# Patient Record
Sex: FEMALE | Race: WHITE | HISPANIC OR LATINO | Employment: UNEMPLOYED | URBAN - METROPOLITAN AREA
[De-identification: names, ages, dates, MRNs, and addresses within clinical notes are randomized per-mention and may not be internally consistent; named-entity substitution may affect disease eponyms.]

---

## 2017-01-08 ENCOUNTER — HOSPITAL ENCOUNTER (EMERGENCY)
Facility: HOSPITAL | Age: 37
Discharge: HOME | End: 2017-01-09
Attending: EMERGENCY MEDICINE | Admitting: EMERGENCY MEDICINE
Payer: COMMERCIAL

## 2017-01-08 DIAGNOSIS — R55 SYNCOPE: ICD-10-CM

## 2017-01-08 DIAGNOSIS — G62.9 PERIPHERAL NEUROPATHY: Primary | ICD-10-CM

## 2017-01-08 LAB
ANION GAP SERPL CALCULATED.3IONS-SCNC: 7 MMOL/L (ref 4–13)
BASOPHILS # BLD AUTO: 0.03 THOUSANDS/ΜL (ref 0–0.1)
BASOPHILS NFR BLD AUTO: 0 % (ref 0–1)
BUN SERPL-MCNC: 16 MG/DL (ref 5–25)
CALCIUM SERPL-MCNC: 8.1 MG/DL (ref 8.3–10.1)
CHLORIDE SERPL-SCNC: 102 MMOL/L (ref 100–108)
CO2 SERPL-SCNC: 27 MMOL/L (ref 21–32)
CREAT SERPL-MCNC: 1.25 MG/DL (ref 0.6–1.3)
EOSINOPHIL # BLD AUTO: 0.15 THOUSAND/ΜL (ref 0–0.61)
EOSINOPHIL NFR BLD AUTO: 2 % (ref 0–6)
ERYTHROCYTE [DISTWIDTH] IN BLOOD BY AUTOMATED COUNT: 15.1 % (ref 11.6–15.1)
GFR SERPL CREATININE-BSD FRML MDRD: 48 ML/MIN/1.73SQ M
GLUCOSE SERPL-MCNC: 360 MG/DL (ref 65–140)
HCT VFR BLD AUTO: 39.4 % (ref 34.8–46.1)
HGB BLD-MCNC: 12.9 G/DL (ref 11.5–15.4)
LYMPHOCYTES # BLD AUTO: 1.39 THOUSANDS/ΜL (ref 0.6–4.47)
LYMPHOCYTES NFR BLD AUTO: 17 % (ref 14–44)
MCH RBC QN AUTO: 29.3 PG (ref 26.8–34.3)
MCHC RBC AUTO-ENTMCNC: 32.7 G/DL (ref 31.4–37.4)
MCV RBC AUTO: 90 FL (ref 82–98)
MONOCYTES # BLD AUTO: 0.74 THOUSAND/ΜL (ref 0.17–1.22)
MONOCYTES NFR BLD AUTO: 9 % (ref 4–12)
NEUTROPHILS # BLD AUTO: 5.82 THOUSANDS/ΜL (ref 1.85–7.62)
NEUTS SEG NFR BLD AUTO: 72 % (ref 43–75)
NRBC BLD AUTO-RTO: 0 /100 WBCS
PLATELET # BLD AUTO: 195 THOUSANDS/UL (ref 149–390)
PMV BLD AUTO: 12.6 FL (ref 8.9–12.7)
POTASSIUM SERPL-SCNC: 5.8 MMOL/L (ref 3.5–5.3)
RBC # BLD AUTO: 4.4 MILLION/UL (ref 3.81–5.12)
SODIUM SERPL-SCNC: 136 MMOL/L (ref 136–145)
WBC # BLD AUTO: 8.17 THOUSAND/UL (ref 4.31–10.16)

## 2017-01-08 PROCEDURE — 81025 URINE PREGNANCY TEST: CPT | Performed by: EMERGENCY MEDICINE

## 2017-01-08 PROCEDURE — 80048 BASIC METABOLIC PNL TOTAL CA: CPT | Performed by: EMERGENCY MEDICINE

## 2017-01-08 PROCEDURE — 36415 COLL VENOUS BLD VENIPUNCTURE: CPT | Performed by: EMERGENCY MEDICINE

## 2017-01-08 PROCEDURE — 81002 URINALYSIS NONAUTO W/O SCOPE: CPT | Performed by: EMERGENCY MEDICINE

## 2017-01-08 PROCEDURE — 85025 COMPLETE CBC W/AUTO DIFF WBC: CPT | Performed by: EMERGENCY MEDICINE

## 2017-01-08 PROCEDURE — 93005 ELECTROCARDIOGRAM TRACING: CPT | Performed by: EMERGENCY MEDICINE

## 2017-01-08 PROCEDURE — 96360 HYDRATION IV INFUSION INIT: CPT

## 2017-01-08 RX ORDER — ACETAMINOPHEN 325 MG/1
650 TABLET ORAL ONCE
Status: COMPLETED | OUTPATIENT
Start: 2017-01-08 | End: 2017-01-08

## 2017-01-08 RX ADMIN — SODIUM CHLORIDE 1000 ML: 0.9 INJECTION, SOLUTION INTRAVENOUS at 22:43

## 2017-01-08 RX ADMIN — ACETAMINOPHEN 650 MG: 325 TABLET, FILM COATED ORAL at 22:43

## 2017-01-09 VITALS
RESPIRATION RATE: 18 BRPM | HEART RATE: 88 BPM | WEIGHT: 293 LBS | HEIGHT: 65 IN | TEMPERATURE: 98 F | OXYGEN SATURATION: 99 % | SYSTOLIC BLOOD PRESSURE: 141 MMHG | BODY MASS INDEX: 48.82 KG/M2 | DIASTOLIC BLOOD PRESSURE: 67 MMHG

## 2017-01-09 LAB
ATRIAL RATE: 95 BPM
BILIRUB UR QL STRIP: NEGATIVE
CLARITY UR: CLEAR
COLOR UR: YELLOW
COLOR, POC: NORMAL
GLUCOSE UR STRIP-MCNC: ABNORMAL MG/DL
HGB UR QL STRIP.AUTO: NEGATIVE
KETONES UR STRIP-MCNC: NEGATIVE MG/DL
LEUKOCYTE ESTERASE UR QL STRIP: NEGATIVE
NITRITE UR QL STRIP: NEGATIVE
P AXIS: 59 DEGREES
PH UR STRIP.AUTO: 5.5 [PH] (ref 4.5–8)
POTASSIUM SERPL-SCNC: 4.5 MMOL/L (ref 3.5–5.3)
PR INTERVAL: 130 MS
PROT UR STRIP-MCNC: NEGATIVE MG/DL
QRS AXIS: 49 DEGREES
QRSD INTERVAL: 90 MS
QT INTERVAL: 346 MS
QTC INTERVAL: 434 MS
SP GR UR STRIP.AUTO: 1.02 (ref 1–1.03)
T WAVE AXIS: 45 DEGREES
UROBILINOGEN UR QL STRIP.AUTO: 0.2 E.U./DL
VENTRICULAR RATE: 95 BPM

## 2017-01-09 PROCEDURE — 36415 COLL VENOUS BLD VENIPUNCTURE: CPT | Performed by: EMERGENCY MEDICINE

## 2017-01-09 PROCEDURE — 99283 EMERGENCY DEPT VISIT LOW MDM: CPT

## 2017-01-09 PROCEDURE — 84132 ASSAY OF SERUM POTASSIUM: CPT | Performed by: EMERGENCY MEDICINE

## 2017-01-09 PROCEDURE — 81003 URINALYSIS AUTO W/O SCOPE: CPT

## 2017-02-03 ENCOUNTER — RX ONLY (RX ONLY)
Age: 37
End: 2017-02-03

## 2017-02-03 ENCOUNTER — DOCTOR'S OFFICE (OUTPATIENT)
Dept: URBAN - METROPOLITAN AREA CLINIC 136 | Facility: CLINIC | Age: 37
Setting detail: OPHTHALMOLOGY
End: 2017-02-03
Payer: COMMERCIAL

## 2017-02-03 DIAGNOSIS — E11.3212: ICD-10-CM

## 2017-02-03 DIAGNOSIS — F17.200: ICD-10-CM

## 2017-02-03 DIAGNOSIS — E11.3291: ICD-10-CM

## 2017-02-03 DIAGNOSIS — H40.003: ICD-10-CM

## 2017-02-03 DIAGNOSIS — H25.13: ICD-10-CM

## 2017-02-03 PROCEDURE — 92134 CPTRZ OPH DX IMG PST SGM RTA: CPT | Performed by: OPHTHALMOLOGY

## 2017-02-03 PROCEDURE — 92014 COMPRE OPH EXAM EST PT 1/>: CPT | Performed by: OPHTHALMOLOGY

## 2017-02-03 ASSESSMENT — REFRACTION_CURRENTRX
OS_OVR_VA: 20/
OD_OVR_VA: 20/
OD_OVR_VA: 20/
OS_OVR_VA: 20/
OS_OVR_VA: 20/
OD_OVR_VA: 20/

## 2017-02-03 ASSESSMENT — CONFRONTATIONAL VISUAL FIELD TEST (CVF)
OD_FINDINGS: FULL
OS_FINDINGS: FULL

## 2017-02-03 ASSESSMENT — REFRACTION_MANIFEST
OS_VA2: 20/
OD_VA1: 20/
OS_VA1: 20/
OD_VA2: 20/
OU_VA: 20/
OD_VA3: 20/
OU_VA: 20/
OS_VA3: 20/
OD_VA1: 20/
OS_VA3: 20/
OD_VA3: 20/
OD_VA3: 20/
OU_VA: 20/
OS_VA1: 20/
OD_VA2: 20/
OS_VA3: 20/
OS_VA2: 20/
OD_VA2: 20/
OS_VA2: 20/
OS_VA1: 20/
OD_VA1: 20/

## 2017-02-03 ASSESSMENT — VISUAL ACUITY
OS_BCVA: 20/70
OD_BCVA: 20/60

## 2017-02-14 ENCOUNTER — ALLSCRIPTS OFFICE VISIT (OUTPATIENT)
Dept: OTHER | Facility: OTHER | Age: 37
End: 2017-02-14

## 2017-02-14 DIAGNOSIS — E11.40 TYPE 2 DIABETES MELLITUS WITH DIABETIC NEUROPATHY (HCC): ICD-10-CM

## 2017-04-02 ENCOUNTER — HOSPITAL ENCOUNTER (EMERGENCY)
Facility: HOSPITAL | Age: 37
Discharge: HOME | End: 2017-04-02
Attending: EMERGENCY MEDICINE
Payer: COMMERCIAL

## 2017-04-02 ENCOUNTER — APPOINTMENT (EMERGENCY)
Dept: RADIOLOGY | Facility: HOSPITAL | Age: 37
End: 2017-04-02
Payer: COMMERCIAL

## 2017-04-02 VITALS
TEMPERATURE: 97.5 F | SYSTOLIC BLOOD PRESSURE: 167 MMHG | HEART RATE: 80 BPM | OXYGEN SATURATION: 97 % | DIASTOLIC BLOOD PRESSURE: 99 MMHG | RESPIRATION RATE: 18 BRPM | BODY MASS INDEX: 49.92 KG/M2 | WEIGHT: 293 LBS

## 2017-04-02 DIAGNOSIS — L03.211 FACIAL CELLULITIS: ICD-10-CM

## 2017-04-02 DIAGNOSIS — R22.0 FACIAL SWELLING: Primary | ICD-10-CM

## 2017-04-02 LAB
ANION GAP SERPL CALCULATED.3IONS-SCNC: 5 MMOL/L (ref 4–13)
BASOPHILS # BLD AUTO: 0.01 THOUSANDS/ΜL (ref 0–0.1)
BASOPHILS NFR BLD AUTO: 0 % (ref 0–1)
BUN SERPL-MCNC: 12 MG/DL (ref 5–25)
CALCIUM SERPL-MCNC: 7.8 MG/DL (ref 8.3–10.1)
CHLORIDE SERPL-SCNC: 103 MMOL/L (ref 100–108)
CO2 SERPL-SCNC: 31 MMOL/L (ref 21–32)
CREAT SERPL-MCNC: 1.21 MG/DL (ref 0.6–1.3)
EOSINOPHIL # BLD AUTO: 0.11 THOUSAND/ΜL (ref 0–0.61)
EOSINOPHIL NFR BLD AUTO: 2 % (ref 0–6)
ERYTHROCYTE [DISTWIDTH] IN BLOOD BY AUTOMATED COUNT: 15.3 % (ref 11.6–15.1)
GFR SERPL CREATININE-BSD FRML MDRD: 49.5 ML/MIN/1.73SQ M
GLUCOSE SERPL-MCNC: 362 MG/DL (ref 65–140)
HCT VFR BLD AUTO: 38.4 % (ref 34.8–46.1)
HGB BLD-MCNC: 12.6 G/DL (ref 11.5–15.4)
LYMPHOCYTES # BLD AUTO: 1.48 THOUSANDS/ΜL (ref 0.6–4.47)
LYMPHOCYTES NFR BLD AUTO: 21 % (ref 14–44)
MCH RBC QN AUTO: 29.6 PG (ref 26.8–34.3)
MCHC RBC AUTO-ENTMCNC: 32.8 G/DL (ref 31.4–37.4)
MCV RBC AUTO: 90 FL (ref 82–98)
MONOCYTES # BLD AUTO: 0.47 THOUSAND/ΜL (ref 0.17–1.22)
MONOCYTES NFR BLD AUTO: 7 % (ref 4–12)
NEUTROPHILS # BLD AUTO: 4.9 THOUSANDS/ΜL (ref 1.85–7.62)
NEUTS SEG NFR BLD AUTO: 70 % (ref 43–75)
NRBC BLD AUTO-RTO: 0 /100 WBCS
PLATELET # BLD AUTO: 159 THOUSANDS/UL (ref 149–390)
PMV BLD AUTO: 12.2 FL (ref 8.9–12.7)
POTASSIUM SERPL-SCNC: 3.9 MMOL/L (ref 3.5–5.3)
RBC # BLD AUTO: 4.25 MILLION/UL (ref 3.81–5.12)
SODIUM SERPL-SCNC: 139 MMOL/L (ref 136–145)
WBC # BLD AUTO: 7.01 THOUSAND/UL (ref 4.31–10.16)

## 2017-04-02 PROCEDURE — 99284 EMERGENCY DEPT VISIT MOD MDM: CPT

## 2017-04-02 PROCEDURE — 36415 COLL VENOUS BLD VENIPUNCTURE: CPT | Performed by: EMERGENCY MEDICINE

## 2017-04-02 PROCEDURE — 96361 HYDRATE IV INFUSION ADD-ON: CPT

## 2017-04-02 PROCEDURE — 70487 CT MAXILLOFACIAL W/DYE: CPT

## 2017-04-02 PROCEDURE — 80048 BASIC METABOLIC PNL TOTAL CA: CPT | Performed by: EMERGENCY MEDICINE

## 2017-04-02 PROCEDURE — 85025 COMPLETE CBC W/AUTO DIFF WBC: CPT | Performed by: EMERGENCY MEDICINE

## 2017-04-02 PROCEDURE — 96374 THER/PROPH/DIAG INJ IV PUSH: CPT

## 2017-04-02 PROCEDURE — 96372 THER/PROPH/DIAG INJ SC/IM: CPT

## 2017-04-02 RX ORDER — CITALOPRAM 40 MG/1
TABLET ORAL
COMMUNITY

## 2017-04-02 RX ORDER — CLINDAMYCIN HYDROCHLORIDE 150 MG/1
450 CAPSULE ORAL ONCE
Status: COMPLETED | OUTPATIENT
Start: 2017-04-02 | End: 2017-04-02

## 2017-04-02 RX ORDER — INSULIN GLARGINE 100 [IU]/ML
5 INJECTION, SOLUTION SUBCUTANEOUS ONCE
Status: DISCONTINUED | OUTPATIENT
Start: 2017-04-02 | End: 2017-04-02

## 2017-04-02 RX ORDER — GABAPENTIN 400 MG/1
CAPSULE ORAL
COMMUNITY
Start: 2015-09-14

## 2017-04-02 RX ORDER — CLINDAMYCIN HYDROCHLORIDE 150 MG/1
450 CAPSULE ORAL 3 TIMES DAILY
Qty: 90 CAPSULE | Refills: 0 | Status: SHIPPED | OUTPATIENT
Start: 2017-04-02 | End: 2017-04-12

## 2017-04-02 RX ORDER — KETOROLAC TROMETHAMINE 30 MG/ML
15 INJECTION, SOLUTION INTRAMUSCULAR; INTRAVENOUS ONCE
Status: COMPLETED | OUTPATIENT
Start: 2017-04-02 | End: 2017-04-02

## 2017-04-02 RX ORDER — LISINOPRIL 20 MG/1
TABLET ORAL
COMMUNITY
Start: 2013-01-10

## 2017-04-02 RX ORDER — INSULIN GLARGINE 100 [IU]/ML
INJECTION, SOLUTION SUBCUTANEOUS
COMMUNITY
Start: 2013-01-10

## 2017-04-02 RX ADMIN — INSULIN HUMAN 5 UNITS: 100 INJECTION, SOLUTION PARENTERAL at 15:34

## 2017-04-02 RX ADMIN — KETOROLAC TROMETHAMINE 15 MG: 30 INJECTION, SOLUTION INTRAMUSCULAR at 14:38

## 2017-04-02 RX ADMIN — IOHEXOL 85 ML: 350 INJECTION, SOLUTION INTRAVENOUS at 15:17

## 2017-04-02 RX ADMIN — CLINDAMYCIN HYDROCHLORIDE 450 MG: 150 CAPSULE ORAL at 17:17

## 2017-04-02 RX ADMIN — SODIUM CHLORIDE 1000 ML: 0.9 INJECTION, SOLUTION INTRAVENOUS at 15:30

## 2017-05-08 ENCOUNTER — ALLSCRIPTS OFFICE VISIT (OUTPATIENT)
Dept: OTHER | Facility: OTHER | Age: 37
End: 2017-05-08

## 2017-05-16 ENCOUNTER — ALLSCRIPTS OFFICE VISIT (OUTPATIENT)
Dept: OTHER | Facility: OTHER | Age: 37
End: 2017-05-16

## 2017-06-26 ENCOUNTER — APPOINTMENT (OUTPATIENT)
Dept: LAB | Facility: HOSPITAL | Age: 37
End: 2017-06-26
Payer: COMMERCIAL

## 2017-06-26 ENCOUNTER — TRANSCRIBE ORDERS (OUTPATIENT)
Dept: LAB | Facility: HOSPITAL | Age: 37
End: 2017-06-26

## 2017-06-26 DIAGNOSIS — Z13.9 SCREENING FOR CONDITION: ICD-10-CM

## 2017-06-26 DIAGNOSIS — E11.69 TYPE 2 DIABETES MELLITUS WITH OTHER SPECIFIED COMPLICATION (HCC): ICD-10-CM

## 2017-06-26 DIAGNOSIS — Z79.899 HIGH RISK MEDICATION USE: Primary | ICD-10-CM

## 2017-06-26 DIAGNOSIS — Z79.899 HIGH RISK MEDICATION USE: ICD-10-CM

## 2017-06-26 LAB
25(OH)D3 SERPL-MCNC: 23 NG/ML (ref 30–100)
ALBUMIN SERPL BCP-MCNC: 2.8 G/DL (ref 3.5–5)
ALP SERPL-CCNC: 112 U/L (ref 46–116)
ALT SERPL W P-5'-P-CCNC: 31 U/L (ref 12–78)
ANION GAP SERPL CALCULATED.3IONS-SCNC: 5 MMOL/L (ref 4–13)
AST SERPL W P-5'-P-CCNC: 29 U/L (ref 5–45)
BILIRUB SERPL-MCNC: 0.29 MG/DL (ref 0.2–1)
BUN SERPL-MCNC: 13 MG/DL (ref 5–25)
CALCIUM SERPL-MCNC: 8.4 MG/DL (ref 8.3–10.1)
CHLORIDE SERPL-SCNC: 102 MMOL/L (ref 100–108)
CHOLEST SERPL-MCNC: 140 MG/DL (ref 50–200)
CO2 SERPL-SCNC: 28 MMOL/L (ref 21–32)
CREAT SERPL-MCNC: 1.02 MG/DL (ref 0.6–1.3)
ERYTHROCYTE [DISTWIDTH] IN BLOOD BY AUTOMATED COUNT: 14.3 % (ref 11.6–15.1)
EST. AVERAGE GLUCOSE BLD GHB EST-MCNC: 266 MG/DL
FERRITIN SERPL-MCNC: 43 NG/ML (ref 8–388)
FOLATE SERPL-MCNC: 14.2 NG/ML (ref 3.1–17.5)
GFR SERPL CREATININE-BSD FRML MDRD: >60 ML/MIN/1.73SQ M
GLUCOSE P FAST SERPL-MCNC: 308 MG/DL (ref 65–99)
HBA1C MFR BLD: 10.9 % (ref 4.2–6.3)
HCT VFR BLD AUTO: 40.5 % (ref 34.8–46.1)
HCYS SERPL-SCNC: 8.9 UMOL/L (ref 3.7–11.2)
HDLC SERPL-MCNC: 25 MG/DL (ref 40–60)
HGB BLD-MCNC: 13.5 G/DL (ref 11.5–15.4)
LDLC SERPL CALC-MCNC: 77 MG/DL (ref 0–100)
MCH RBC QN AUTO: 30.1 PG (ref 26.8–34.3)
MCHC RBC AUTO-ENTMCNC: 33.3 G/DL (ref 31.4–37.4)
MCV RBC AUTO: 90 FL (ref 82–98)
PLATELET # BLD AUTO: 201 THOUSANDS/UL (ref 149–390)
PMV BLD AUTO: 12.4 FL (ref 8.9–12.7)
POTASSIUM SERPL-SCNC: 4 MMOL/L (ref 3.5–5.3)
PROT SERPL-MCNC: 6.7 G/DL (ref 6.4–8.2)
RBC # BLD AUTO: 4.49 MILLION/UL (ref 3.81–5.12)
SODIUM SERPL-SCNC: 135 MMOL/L (ref 136–145)
TRIGL SERPL-MCNC: 188 MG/DL
TSH SERPL DL<=0.05 MIU/L-ACNC: 1.63 UIU/ML (ref 0.36–3.74)
VIT B12 SERPL-MCNC: 577 PG/ML (ref 100–900)
WBC # BLD AUTO: 5.93 THOUSAND/UL (ref 4.31–10.16)

## 2017-06-26 PROCEDURE — 82306 VITAMIN D 25 HYDROXY: CPT

## 2017-06-26 PROCEDURE — 84443 ASSAY THYROID STIM HORMONE: CPT

## 2017-06-26 PROCEDURE — 85027 COMPLETE CBC AUTOMATED: CPT

## 2017-06-26 PROCEDURE — 82746 ASSAY OF FOLIC ACID SERUM: CPT

## 2017-06-26 PROCEDURE — 36415 COLL VENOUS BLD VENIPUNCTURE: CPT

## 2017-06-26 PROCEDURE — 83036 HEMOGLOBIN GLYCOSYLATED A1C: CPT

## 2017-06-26 PROCEDURE — 80061 LIPID PANEL: CPT

## 2017-06-26 PROCEDURE — 82607 VITAMIN B-12: CPT

## 2017-06-26 PROCEDURE — 82728 ASSAY OF FERRITIN: CPT

## 2017-06-26 PROCEDURE — 80053 COMPREHEN METABOLIC PANEL: CPT

## 2017-06-26 PROCEDURE — 83090 ASSAY OF HOMOCYSTEINE: CPT

## 2017-07-28 ENCOUNTER — ALLSCRIPTS OFFICE VISIT (OUTPATIENT)
Dept: OTHER | Facility: OTHER | Age: 37
End: 2017-07-28

## 2017-08-10 ENCOUNTER — ALLSCRIPTS OFFICE VISIT (OUTPATIENT)
Dept: OTHER | Facility: OTHER | Age: 37
End: 2017-08-10

## 2018-01-09 NOTE — MISCELLANEOUS
Provider Comments  Provider Comments:   PT WAS A NO SHOW TODAY      Signatures   Electronically signed by : Mardy Najjar, ; Jul 28 2017  2:00PM EST                       (Author)

## 2018-01-11 NOTE — RESULT NOTES
Verified Results  (1) CBC/PLT/DIFF 27Dec2016 12:04PM Mariela Oden Order Number: [id number]     Test Name Result Flag Reference   WBC COUNT 6 64 Thousand/uL  4 31-10 16   RBC COUNT 4 64 Million/uL  3 81-5 12   HEMOGLOBIN 13 5 g/dL  11 5-15 4   HEMATOCRIT 41 2 %  34 8-46  1   MCV 89 fL  82-98   MCH 29 1 pg  26 8-34 3   MCHC 32 8 g/dL  31 4-37 4   RDW 15 4 % H 11 6-15 1   MPV 12 6 fL  8 9-12 7   PLATELET COUNT 859 Thousands/uL  149-390   nRBC AUTOMATED 0 /100 WBCs     NEUTROPHILS RELATIVE PERCENT 63 %  43-75   LYMPHOCYTES RELATIVE PERCENT 26 %  14-44   MONOCYTES RELATIVE PERCENT 9 %  4-12   EOSINOPHILS RELATIVE PERCENT 2 %  0-6   BASOPHILS RELATIVE PERCENT 0 %  0-1   NEUTROPHILS ABSOLUTE COUNT 4 08 Thousands/?L  1 85-7 62   LYMPHOCYTES ABSOLUTE COUNT 1 75 Thousands/?L  0 60-4 47   MONOCYTES ABSOLUTE COUNT 0 62 Thousand/?L  0 17-1 22   EOSINOPHILS ABSOLUTE COUNT 0 14 Thousand/?L  0 00-0 61   BASOPHILS ABSOLUTE COUNT 0 02 Thousands/?L  0 00-0 10   - Patient Instructions: This bloodwork is non-fasting  Please drink two glasses of water morning of bloodwork  - Patient Instructions: This bloodwork is non-fasting  Please drink two glasses of water morning of bloodwork  (1) LIPID PANEL, FASTING 27Dec2016 12:04PM Mariela Oden Order Number: [id number]     Test Name Result Flag Reference   CHOLESTEROL 188 mg/dL     HDL,DIRECT 40 mg/dL  40-60   Specimen collection should occur prior to Metamizole administration due to the potential for falsely depressed results  LDL CHOLESTEROL CALCULATED 115 mg/dL H 0-100   - Patient Instructions: This is a fasting blood test  Water,black tea or black  coffee only after 9:00pm the night before test   Drink 2 glasses of water the morning of test     - Patient Instructions:  This is a fasting blood test  Water,black tea or black  coffee only after 9:00pm the night before test Drink 2 glasses of water the morning of test   Triglyceride: Normal              <150 mg/dl       Borderline High    150-199 mg/dl       High               200-499 mg/dl       Very High          >499 mg/dl  Cholesterol:         Desirable        <200 mg/dl      Borderline High  200-239 mg/dl      High             >239 mg/dl  HDL Cholesterol:        High    >59 mg/dL      Low     <41 mg/dL  LDL CALCULATED:    This screening LDL is a calculated result  It does not have the accuracy of the Direct Measured LDL in the monitoring of patients with hyperlipidemia and/or statin therapy  Direct Measure LDL (ADV620) must be ordered separately in these patients  TRIGLYCERIDES 167 mg/dL H <=150   Specimen collection should occur prior to N-Acetylcysteine or Metamizole administration due to the potential for falsely depressed results  (1) HEMOGLOBIN A1C 27Mdo3837 12:04PM [facility]  Order Number: [id number]     Test Name Result Flag Reference   HEMOGLOBIN A1C 11 6 % H 4 2-6 3   EST  AVG  GLUCOSE 286 mg/dl       (1) MICROALBUMIN CREATININE RATIO, RANDOM URINE 15Mxo1478 12:04PM [facility]  Order Number: [id number]     Test Name Result Flag Reference   MICROALBUMIN/ CREAT R 13 mg/g creatinine  0-30   MICROALBUMIN,URINE 19 2 mg/L  0 0-20 0   CREATININE URINE 147 0 mg/dL       (1) COMPREHENSIVE METABOLIC PANEL 86OZN8627 62:28FG [facility]  Order Number: [id number]     Test Name Result Flag Reference   GLUCOSE,RANDM 268 mg/dL H    If the patient is fasting, the ADA then defines impaired fasting glucose as > 100 mg/dL and diabetes as > or equal to 123 mg/dL     SODIUM 138 mmol/L  136-145   POTASSIUM 4 1 mmol/L  3 5-5 3   CHLORIDE 101 mmol/L  100-108   CARBON DIOXIDE 28 mmol/L  21-32   ANION GAP (CALC) 9 mmol/L  4-13   BLOOD UREA NITROGEN 12 mg/dL  5-25   CREATININE 0 87 mg/dL  0 60-1 30   Standardized to IDMS reference method   CALCIUM 8 6 mg/dL  8 3-10 1   BILI, TOTAL 0 25 mg/dL  0 20-1 00   ALK PHOSPHATAS 112 U/L     ALT (SGPT) 25 U/L  12-78   AST(SGOT) 13 U/L  5-45   ALBUMIN 3 3 g/dL L 3 5-5 0   TOTAL PROTEIN 6 6 g/dL  6 4-8 2   eGFR Non-African American      >60 0 ml/min/1 73sq m   - Patient Instructions: This is a fasting blood test  Water,black tea or black  coffee only after 9:00pm the night before test Drink 2 glasses of water the morning of test   National Kidney Disease Education Program recommendations are as follows:  GFR calculation is accurate only with a steady state creatinine  Chronic Kidney disease less than 60 ml/min/1 73 sq  meters  Kidney failure less than 15 ml/min/1 73 sq  meters  (1) VITAMIN B12 01Zgd2794 12:04PM [employer] [facility] Order Number: [id number]     Test Name Result Flag Reference   VITAMIN B12 545 pg/mL  100-900   - Patient Instructions: This is a fasting blood test  Water,black tea or black  coffee only after 9:00pm the night before test Drink 2 glasses of water the morning of test      (1) FOLATE 07Eod9854 12:04PM [facility] Order Number: [id number]     Test Name Result Flag Reference   FOLATE 13 5 ng/mL  3 1-17 5   - Patient Instructions: This is a fasting blood test  Water,black tea or black  coffee only after 9:00pm the night before test Drink 2 glasses of water the morning of test        Plan  Uncontrolled type 2 diabetes mellitus with peripheral autonomic neuropathy    · (1) HEMOGLOBIN A1C ; every 3 months; Last 28PYA2018; Next 95TTT7711;  Status:Active   · (1) MICROALBUMIN CREATININE RATIO, RANDOM URINE ; every 1 year;  Last  33HLO4459; Next 74OSV3926; Status:Active

## 2018-01-11 NOTE — MISCELLANEOUS
Provider Comments  Provider Comments:   PT NO SHOWED TODAYS APPT      Signatures   Electronically signed by : Neal Hatfield, ; Aug 31 2016  4:41PM EST                       (Author)

## 2018-01-11 NOTE — MISCELLANEOUS
Provider Comments  Provider Comments:   pt no show for appt today      Signatures   Electronically signed by :  Ricardo Zamora, ; Jan 21 2016 11:09AM EST                       (Author)    Electronically signed by : ARMEN Padilla ; Jan 21 2016 11:57AM EST                       (Author)

## 2018-01-11 NOTE — MISCELLANEOUS
Provider Comments  Provider Comments:   pt  no showed today      Signatures   Electronically signed by : Rosalinda Acuna, ; May 16 2016 11:27AM EST                       (Author)    Electronically signed by : ARMEN Salcedo ; May 16 2016 11:44AM EST                       (Author)

## 2018-01-12 NOTE — MISCELLANEOUS
Provider Comments  Provider Comments:   pt no showed appt today      Signatures   Electronically signed by : Bonnie Grimm, ; Nov 17 2016  3:44PM EST                       (Author)

## 2018-01-12 NOTE — PROGRESS NOTES
History of Present Illness  Care Coordination Encounter Information:   Type of Encounter: Telephonic    Spoke to Patient  Care Coordination [facility] Nurse [facility]:   The reason for call is to discuss outreach for follow up/needed services  Reached out to patient today to update her on an endocrinology referral  Per Dr Abbi Grover, he will discuss this with her on her next appointment, which is 2016  Patient verbally understands and thanked me for the update  Active Problems    1  Amenorrhea (626 0) (N91 2)   2  Cellulitis (682 9) (L03 90)   3  Cervical cancer screening (V76 2) (Z12 4)   4  Complete spontaneous  without mention of complication (580 22) (S95 7)   5  Depression (311) (F32 9)   6  Diabetic neuropathy (250 60,357 2) (E11 40)   7  Diabetic retinopathy screening (V80 2) (Z13 5)   8  Esophageal reflux (530 81) (K21 9)   9  Hyperlipidemia (272 4) (E78 5)   10  Hypertension (401 9) (I10)   11  Left knee pain (719 46) (M25 562)   12  Morbid or severe obesity due to excess calories (278 01) (E66 01)   13  Need for immunization against influenza (V04 81) (Z23)   14  Need for pneumococcal vaccine (V03 82) (Z23)   15  Need for prophylactic vaccination and inoculation against influenza (V04 81) (Z23)   16  Need for vaccination with 13-polyvalent pneumococcal conjugate vaccine (V03 82) (Z23)   17  Pain in elbow joint (719 42) (M25 529)   18  Polyneuropathy associated with underlying disease (357 4) (G63)   19  Right knee pain (719 46) (M25 561)   20  Sebaceous cyst (706 2) (L72 3)   21  History of Supervision of normal first pregnancy (V22 0) (Z34 00)   22  Tobacco use (305 1) (Z72 0)   23  Uncontrolled type 2 diabetes mellitus with peripheral autonomic neuropathy    (250 62,337 1) (E11 43,E11 65)   24  Vulvovaginitis candida albicans (112 1) (B37 3)    Past Medical History    1  History of obesity (V13 89) (Z86 39)   2   Need for prophylactic vaccination and inoculation against influenza (V04 81) (Z23)   3  History of Supervision of normal first pregnancy (V22 0) (Z34 00)    Surgical History    1  History of Cholecystectomy   2  History of Incision And Drainage Of Skin Abscess   3  History of Recent Surgical     Family History  Maternal Uncle    1  Family history of Colon Cancer (V16 0)  Family History    2  Family history of Benign Essential Hypertension   3  Family history of Type 2 Diabetes Mellitus    Social History    · Denied: History of Alcohol Use (History)   · Current Every Day Smoker (305 1)   · Denied: History of Drug Use   · Tobacco use (305 1) (Z72 0)   · Uses Safety Equipment - Seatbelts    Current Meds    1  Citalopram Hydrobromide 20 MG Oral Tablet; TAKE 1 1/2  TABLETs TWICE DAILY; Therapy: 84RUR8666 to (Evaluate:01Rpy4348)  Requested for: 93CJM1290; Last   Rx:2016 Ordered    2  Blood Pressure Monitor Automat Device; Adult Large-XL Blood Pressure Monitor Device   Electronic; Therapy: 36PUW1566 to (Evaluate:2016); Last Rx:22Vaa8603 Ordered   3  Lisinopril 20 MG Oral Tablet; take 1 tablet every day; Therapy: 41QFI2784 to (Evaluate:85Rvl0380)  Requested for: 92UQP8583; Last   Rx:2016 Ordered    4  Lantus 100 UNIT/ML Subcutaneous Solution; INJECT UP TO 30 UNITS   SUBCUTANEOUSLY ONCE A DAY AS DIRECTED; Therapy: 19VIO5645 to (Evaluate:78Agw6000)  Requested for: 2015; Last   Rx:2015 Ordered   5  MetFORMIN HCl - 1000 MG Oral Tablet; TAKE 1 TABLET 2 TIMES A DAY WITH MEALS; Therapy: 14QJI2441 to (Romario Page)  Requested for: 2015; Last   Rx:2015 Ordered    6  HumaLOG 100 UNIT/ML Subcutaneous Solution; INJECT 15 UNIT 3 times daily; Therapy: (Astrid Howell) to Recorded   7  OxyCODONE HCl - 5 MG Oral Capsule; TAKE 1 CAPSULE Every 4 hours; Therapy: (Recorded:2016) to Recorded    Allergies    1  No Known Drug Allergies    2  No Known Environmental Allergies   3   No Known Food Allergies    Health Management   (1) THIN PREP PAP WITH IMAGING; every 3 years; Next Due: 55DOW0029; Overdue   *VB-Foot Exam; every 1 year; Next Due: 81Jss5180; Active   *VB - Eye Exam; every 2 years; Last 86GZM4206; Next Due: 48Jpt2462; Active   (1) HEMOGLOBIN A1C; every 3 months; Last 69LRJ4283; Next Due: 15Cmk5019; Active  (1) MICROALBUMIN CREATININE RATIO, RANDOM URINE; every 1 year; Last 47EDQ1501; Next Due:  88SPB6231; Active    End of Encounter Meds    1  Citalopram Hydrobromide 20 MG Oral Tablet; TAKE 1 1/2  TABLETs TWICE DAILY; Therapy: 72LLY9424 to (Evaluate:92Ypz9528)  Requested for: 74BSE9970; Last   Rx:06Jan2016 Ordered    2  Blood Pressure Monitor Automat Device; Adult Large-XL Blood Pressure Monitor Device   Electronic; Therapy: 03BPM7375 to (Evaluate:17Jan2016); Last Rx:01Mli8561 Ordered   3  Lisinopril 20 MG Oral Tablet; take 1 tablet every day; Therapy: 19XWL9217 to (Evaluate:15Jqw0479)  Requested for: 76SWG1823; Last   Rx:24May2016 Ordered    4  Lantus 100 UNIT/ML Subcutaneous Solution; INJECT UP TO 30 UNITS   SUBCUTANEOUSLY ONCE A DAY AS DIRECTED; Therapy: 22MLY0717 to (Evaluate:28Ldc9595)  Requested for: 12Oct2015; Last   Rx:12Oct2015 Ordered   5  MetFORMIN HCl - 1000 MG Oral Tablet; TAKE 1 TABLET 2 TIMES A DAY WITH MEALS; Therapy: 57QXY7891 to (Cirilo Search)  Requested for: 12Oct2015; Last   Rx:12Oct2015 Ordered    6  HumaLOG 100 UNIT/ML Subcutaneous Solution; INJECT 15 UNIT 3 times daily; Therapy: (Mahesh Mcclellan) to Recorded   7  OxyCODONE HCl - 5 MG Oral Capsule; TAKE 1 CAPSULE Every 4 hours; Therapy: (Recorded:73Hhx5836) to Recorded    Future Appointments    Date/Time Provider Specialty Site   07/18/2016 10:50 ARMEN Merida  [address]     Message   Recorded as Task   Date: 05/24/2016 11:27 AM, Created By: Jovanny Thompson   Task Name: Follow Up   Assigned To:  Justine Vaca   Regarding Patient: Michael Watters, Status: Active   CommentPenelope Friend - 24 May 2016 11:27 AM Yadira Coyle,  Do you think this patient would benefit from an endocrinology consult? I know she was admitted with uncontrolled blood sugars recently and her last A1c in March was 12 1  I did speak to her today and she states her sugars are high sometimes even with taking her insulin as directed  She is agreeable to seeing endocrinology if she has an order for a referral   Let me know what you think  Thanks! Gus Figueroa - 25 May 2016 8:06 PM     TASK REPLIED TO: Previously Assigned To Gus Figueroa  I will discuss with her at her next visit  Thank you       Signatures   Electronically signed by : Pelon Bryan RN; May 26 2016  2:09PM EST                       (Author)

## 2018-01-12 NOTE — MISCELLANEOUS
Provider Comments  Provider Comments:   pt no showed today      Signatures   Electronically signed by : Renea Wells, ; Oct 27 2016 10:48AM EST                       (Author)

## 2018-01-12 NOTE — MISCELLANEOUS
Provider Comments  Provider Comments:   PT NO SHOW FOR APPT TODAY      Signatures   Electronically signed by :  Rena Stein, ; Feb 15 2016 10:57AM EST                       (Author)    Electronically signed by : ARMEN Vincent ; Feb 16 2016  8:38PM EST                       (Author)

## 2018-01-13 VITALS
HEIGHT: 66 IN | BODY MASS INDEX: 47.09 KG/M2 | DIASTOLIC BLOOD PRESSURE: 80 MMHG | RESPIRATION RATE: 18 BRPM | HEART RATE: 76 BPM | WEIGHT: 293 LBS | TEMPERATURE: 98.8 F | SYSTOLIC BLOOD PRESSURE: 130 MMHG

## 2018-01-13 VITALS
HEART RATE: 92 BPM | SYSTOLIC BLOOD PRESSURE: 120 MMHG | BODY MASS INDEX: 48.82 KG/M2 | DIASTOLIC BLOOD PRESSURE: 82 MMHG | TEMPERATURE: 98.1 F | HEIGHT: 65 IN | WEIGHT: 293 LBS

## 2018-01-13 NOTE — PROGRESS NOTES
History of Present Illness  Care Coordination Encounter Information:   Type of Encounter: Telephonic    Spoke to Patient  Care Coordination  Nurse [address] [place] [address] [address]:   The reason for call is to discuss outreach for follow up/needed services  Reached out to patient who states she is doing well  She saw her PCP this morning and states her blood pressure was high  She is continuing to take her Lisinopril as directed  I asked if anyone had spoken to her about her diabetes and she states no one has  Per patient, she checks her blood sugars at home but does not write them down  She states they can be high at times despite her taking her Humalog and Lantus as directed  She is agreeable to seeing an endocrinologist if one is ordered for her  I told her I will speak with her PCP to see if he thinks she would benefit from a referral  Patient agrees with this plan  Dr Lary Perez tasked; I will wait to hear back from him  Active Problems    1  Amenorrhea (626 0) (N91 2)   2  Cellulitis (682 9) (L03 90)   3  Cervical cancer screening (V76 2) (Z12 4)   4  Complete spontaneous  without mention of complication (828 44) (U26 5)   5  Depression (311) (F32 9)   6  Diabetic neuropathy (250 60,357 2) (E11 40)   7  Diabetic retinopathy screening (V80 2) (Z13 5)   8  Esophageal reflux (530 81) (K21 9)   9  Hyperlipidemia (272 4) (E78 5)   10  Hypertension (401 9) (I10)   11  Left knee pain (719 46) (M25 562)   12  Morbid or severe obesity due to excess calories (278 01) (E66 01)   13  Need for immunization against influenza (V04 81) (Z23)   14  Need for pneumococcal vaccine (V03 82) (Z23)   15  Need for prophylactic vaccination and inoculation against influenza (V04 81) (Z23)   16  Need for vaccination with 13-polyvalent pneumococcal conjugate vaccine (V03 82) (Z23)   17  Pain in elbow joint (719 42) (M25 529)   18  Polyneuropathy associated with underlying disease (357 4) (G63)   19  Right knee pain (719 46) (M25 561)   20  Sebaceous cyst (706 2) (L72 3)   21  History of Supervision of normal first pregnancy (V22 0) (Z34 00)   22  Tobacco use (305 1) (Z72 0)   23  Uncontrolled type 2 diabetes mellitus with peripheral autonomic neuropathy    (250 62,337 1) (E11 43,E11 65)   24  Vulvovaginitis candida albicans (112 1) (B37 3)    Past Medical History    1  History of obesity (V13 89) (Z86 39)   2  Need for prophylactic vaccination and inoculation against influenza (V04 81) (Z23)   3  History of Supervision of normal first pregnancy (V22 0) (Z34 00)    Surgical History    1  History of Cholecystectomy   2  History of Incision And Drainage Of Skin Abscess   3  History of Recent Surgical     Family History  Maternal Uncle    1  Family history of Colon Cancer (V16 0)  Family History    2  Family history of Benign Essential Hypertension   3  Family history of Type 2 Diabetes Mellitus    Social History    · Denied: History of Alcohol Use (History)   · Current Every Day Smoker (305 1)   · Denied: History of Drug Use   · Tobacco use (305 1) (Z72 0)   · Uses Safety Equipment - Seatbelts    Current Meds    1  Citalopram Hydrobromide 20 MG Oral Tablet; TAKE 1 1/2  TABLETs TWICE DAILY; Therapy: 67MGK7128 to (Evaluate:26Bev2366)  Requested for: 56LAS1622; Last   Rx:2016 Ordered    2  Blood Pressure Monitor Automat Device; Adult Large-XL Blood Pressure Monitor Device   Electronic; Therapy: 22NIU7255 to (Evaluate:2016); Last Rx:52Zfc9586 Ordered   3  Lisinopril 20 MG Oral Tablet; take 1 tablet every day; Therapy: 40MGP8706 to (Evaluate:73Zqz4320)  Requested for: 33HKH2876; Last   Rx:18Tvx2983 Ordered    4  Lantus 100 UNIT/ML Subcutaneous Solution; INJECT UP TO 30 UNITS   SUBCUTANEOUSLY ONCE A DAY AS DIRECTED; Therapy: 08GKA4921 to (Evaluate:10Tbc0382)  Requested for: 2015; Last   Rx:2015 Ordered   5  MetFORMIN HCl - 1000 MG Oral Tablet; TAKE 1 TABLET 2 TIMES A DAY WITH MEALS;    Therapy: 03WZZ6270 to (Ramiro Bigness)  Requested for: 12Oct2015; Last   Rx:12Oct2015 Ordered    6  HumaLOG 100 UNIT/ML Subcutaneous Solution; INJECT 15 UNIT 3 times daily; Therapy: (Consuello Severe) to Recorded   7  OxyCODONE HCl - 5 MG Oral Capsule; TAKE 1 CAPSULE Every 4 hours; Therapy: (Recorded:02May2016) to Recorded    Allergies    1  No Known Drug Allergies    2  No Known Environmental Allergies   3  No Known Food Allergies    Health Management   (1) THIN PREP PAP WITH IMAGING; every 3 years; Next Due: 06SQC5959; Overdue   *VB-Foot Exam; every 1 year; Next Due: 93Nsk9732; Active   *VB - Eye Exam; every 2 years; Last 82HYM8272; Next Due: 84Usn2793; Active   (1) HEMOGLOBIN A1C; every 3 months; Last 42TNA9965; Next Due: 10Jun2016; Active  (1) MICROALBUMIN CREATININE RATIO, RANDOM URINE; every 1 year; Last 71EPO8492; Next Due:  96QUM1707; Active    End of Encounter Meds    1  Citalopram Hydrobromide 20 MG Oral Tablet; TAKE 1 1/2  TABLETs TWICE DAILY; Therapy: 75KWY3598 to (Evaluate:68Onx8652)  Requested for: 76SIB6245; Last   Rx:06Jan2016 Ordered    2  Blood Pressure Monitor Automat Device; Adult Large-XL Blood Pressure Monitor Device   Electronic; Therapy: 64MKZ6223 to (Evaluate:17Jan2016); Last Rx:98Wts3573 Ordered   3  Lisinopril 20 MG Oral Tablet; take 1 tablet every day; Therapy: 45DAZ2689 to (Evaluate:23Tvr5808)  Requested for: 16LNJ9763; Last   Rx:24May2016 Ordered    4  Lantus 100 UNIT/ML Subcutaneous Solution; INJECT UP TO 30 UNITS   SUBCUTANEOUSLY ONCE A DAY AS DIRECTED; Therapy: 20QCS5185 to (Evaluate:17Lup2412)  Requested for: 12Oct2015; Last   Rx:12Oct2015 Ordered   5  MetFORMIN HCl - 1000 MG Oral Tablet; TAKE 1 TABLET 2 TIMES A DAY WITH MEALS; Therapy: 80NOK7639 to (Ramiro Bigness)  Requested for: 12Oct2015; Last   Rx:12Oct2015 Ordered    6  HumaLOG 100 UNIT/ML Subcutaneous Solution; INJECT 15 UNIT 3 times daily; Therapy: (Consuello Severe) to Recorded   7   OxyCODONE HCl - 5 MG Oral Capsule; TAKE 1 CAPSULE Every 4 hours; Therapy: (Recorded:50Xae3819) to Recorded    Future Appointments    Date/Time Provider Specialty Site   07/18/2016 10:50 ARMEN Zavala [address]     Signatures   Electronically signed by : Parvez Devine RN; May 24 2016 11:30AM EST                       (Author)

## 2018-01-13 NOTE — MISCELLANEOUS
Provider Comments  Provider Comments:   pt  no showed today      Signatures   Electronically signed by : Antoinette Piedra, ; May 13 2016  3:21PM EST                       (Author)    Electronically signed by :  Phil Tijerina, ; May 13 2016  3:49PM EST                       (Author)    Electronically signed by : Sara May DO; May 17 2016 11:16AM EST                       (Author)

## 2018-01-14 VITALS
BODY MASS INDEX: 47.09 KG/M2 | DIASTOLIC BLOOD PRESSURE: 90 MMHG | TEMPERATURE: 97.5 F | RESPIRATION RATE: 16 BRPM | SYSTOLIC BLOOD PRESSURE: 148 MMHG | WEIGHT: 293 LBS | HEART RATE: 80 BPM | HEIGHT: 66 IN

## 2018-01-15 NOTE — MISCELLANEOUS
Provider Comments  Provider Comments:   pt was a no show today      Signatures   Electronically signed by : Lazarus Coco, ; Aug 10 2017 10:49AM EST                       (Author)

## 2018-01-15 NOTE — RESULT NOTES
Verified Results  (1) HEMOGLOBIN A1C 21LYS7270 09:30AM Gus Figueroa   5 7-6 4% impaired fasting glucose  >=6 5% diagnosis of diabetes    Falsely low levels are seen in conditions linked to short RBC life span-  hemolytic anemia, and splenomegaly  Falsely elevated levels are seen in situations where there is an increased production of RBC- receipt of erythropoietin or blood transfusions  Adopted from ADA-Clinical Practice Recommendations     Test Name Result Flag Reference   HEMOGLOBIN A1C 12 1 % H 4 0-5 6   EST  AVG  GLUCOSE 301 mg/dl         Plan  Uncontrolled type 2 diabetes mellitus with peripheral autonomic neuropathy    · (1) HEMOGLOBIN A1C ; every 3 months;  Last 13ZCR0051; Next 76OEA0983;  Status:Active

## 2018-01-16 NOTE — MISCELLANEOUS
Discussion/Summary  Discussion Summary:   No show  Chief Complaint  Chief Complaint Free Text Note Form: No show      History of Present Illness  TCM Communication [facility]: The patient is being contacted for follow-up after hospitalization and 16  Hospital records were reviewed  She was hospitalized at  The date of admission: 16, date of discharge: 16  Diagnosis: occiput abscess  She was discharged to home  Medications reviewed and updated today  She scheduled a follow up appointment  Topics counseled included instructions for management and importance of compliance with treatment  Communication performed and completed by Aron Cherry RN      Active Problems     1  Amenorrhea (626 0) (N91 2)   2  Cellulitis (682 9) (L03 90)   3  Cervical cancer screening (V76 2) (Z12 4)   4  Complete spontaneous  without mention of complication (209 09) (J64 7)   5  Depression (311) (F32 9)   6  Diabetic neuropathy (250 60,357 2) (E11 40)   7  Diabetic retinopathy screening (V80 2) (Z13 5)   8  Esophageal reflux (530 81) (K21 9)   9  Hyperlipidemia (272 4) (E78 5)   10  Left knee pain (719 46) (M25 562)   11  Morbid or severe obesity due to excess calories (278 01) (E66 01)   12  Need for immunization against influenza (V04 81) (Z23)   13  Need for pneumococcal vaccine (V03 82) (Z23)   14  Need for prophylactic vaccination and inoculation against influenza (V04 81) (Z23)   15  Need for vaccination with 13-polyvalent pneumococcal conjugate vaccine (V03 82) (Z23)   16  Pain in elbow joint (719 42) (M25 529)   17  Polyneuropathy associated with underlying disease (357 4) (G63)   18  Right knee pain (719 46) (M25 561)   19  History of Supervision of normal first pregnancy (V22 0) (Z34 00)   20  Tobacco use (305 1) (Z72 0)   21  Uncontrolled type 2 diabetes mellitus with peripheral autonomic neuropathy    (250 62,337 1) (E11 43,E11 65)   22   Vulvovaginitis candida albicans (112 1) (B37 3)    Hypertension (401  9) (I10)          Past Medical History    1  History of obesity (V13 89) (Z86 39)   2  Need for prophylactic vaccination and inoculation against influenza (V04 81) (Z23)   3  History of Supervision of normal first pregnancy (V22 0) (Z34 00)    Surgical History    1  History of Cholecystectomy   2  History of Recent Surgical     Family History  Maternal Uncle    1  Family history of Colon Cancer (V16 0)  Family History    2  Family history of Benign Essential Hypertension   3  Family history of Type 2 Diabetes Mellitus    Social History    · Denied: History of Alcohol Use (History)   · Current Every Day Smoker (305 1)   · Denied: History of Drug Use   · Tobacco use (305 1) (Z72 0)   · Uses Safety Equipment - Seatbelts    Current Meds   1  Alhaji Contour Test In Vitro Strip; TEST TWICE DAILY DX 69249; Therapy: 13DIC2745 to (Evaluate:2013)  Requested for: 84Oid3179; Last   Rx:83Qlc8015 Ordered   2  Blood Pressure Monitor Automat Device; Adult Large-XL Blood Pressure Monitor Device   Electronic; Therapy: 59THL7941 to (Evaluate:2016); Last Rx:18Yul3943 Ordered   3  Citalopram Hydrobromide 20 MG Oral Tablet; TAKE 1 1/2  TABLETs TWICE DAILY; Therapy: 04YLG9507 to (Evaluate:28Zki0220)  Requested for: 49REN6122; Last   Rx:2016 Ordered   4  HumaLOG 100 UNIT/ML Subcutaneous Solution; INJECT 15 UNIT 3 times daily; Therapy: (Recorded:67Yki8438) to Recorded   5  Lantus 100 UNIT/ML Subcutaneous Solution; INJECT UP TO 30 UNITS   SUBCUTANEOUSLY ONCE A DAY AS DIRECTED; Therapy: 27SDJ8007 to (Evaluate:84Yke7118)  Requested for: 2015; Last   Rx:2015 Ordered   6  Lisinopril 10 MG Oral Tablet; TAKE (1) TABLET DAILY; Therapy: 65OKB2571 to (Evaluate:60Zjj5343)  Requested for: 47REN6342; Last   Rx:2016 Ordered   7  MetFORMIN HCl - 1000 MG Oral Tablet; TAKE 1 TABLET 2 TIMES A DAY WITH MEALS; Therapy: 40VDJ8953 to (Luane )  Requested for: 2015;  Last   Rx:2015 Ordered   8  OxyCODONE HCl - 5 MG Oral Capsule; TAKE 1 CAPSULE Every 4 hours; Therapy: (Recorded:48Zwd7734) to Recorded    Allergies    1  No Known Drug Allergies    2  No Known Environmental Allergies   3  No Known Food Allergies    Health Management  Cervical cancer screening   (1) THIN PREP PAP WITH IMAGING; every 3 years; Next Due: 79KGH2742; Overdue  Diabetic neuropathy   *VB-Foot Exam; every 1 year; Next Due: 22Fds3122; Active  Diabetic retinopathy screening   *VB - Eye Exam; every 2 years; Last 69FYS2424; Next Due: 65Yjn2530; Active  Uncontrolled type 2 diabetes mellitus with peripheral autonomic neuropathy   (1) HEMOGLOBIN A1C; every 3 months; Last 40WSN3788; Next Due: 77Dfh6460; Active  (1) MICROALBUMIN CREATININE RATIO, RANDOM URINE; every 1 year; Last 48EEH1393; Next Due:  75KNA1355; Active    Future Appointments    Date/Time Provider Specialty Site   07/18/2016 10:50 AM ARMEN Ramires  [address]     Signatures   Electronically signed by :  Sendy Samano, ; May 13 2016 10:47AM EST                       (Author)    Electronically signed by : ARMEN Garner ; May 24 2016  4:27PM EST                       (Review)

## 2018-01-17 NOTE — MISCELLANEOUS
Provider Comments  Provider Comments:   PT NO SHOW FOR APPT      Signatures   Electronically signed by :  Moises Ortega, ; Aug  8 2016  3:17PM EST                       (Author)    Electronically signed by : ARMEN Bolden ; Aug  8 2016  3:52PM EST                       (Author)

## 2018-01-17 NOTE — MISCELLANEOUS
Provider Comments  Provider Comments:   PT WAS A NO SHOW TODAY      Signatures   Electronically signed by : Koko Moran, ; Aug 31 2016  1:56PM EST                       (Author)

## 2018-08-20 ENCOUNTER — DOCTOR'S OFFICE (OUTPATIENT)
Dept: URBAN - METROPOLITAN AREA CLINIC 136 | Facility: CLINIC | Age: 38
Setting detail: OPHTHALMOLOGY
End: 2018-08-20
Payer: COMMERCIAL

## 2018-08-20 DIAGNOSIS — H25.13: ICD-10-CM

## 2018-08-20 DIAGNOSIS — E11.3493: ICD-10-CM

## 2018-08-20 DIAGNOSIS — F17.200: ICD-10-CM

## 2018-08-20 DIAGNOSIS — E11.3293: ICD-10-CM

## 2018-08-20 PROCEDURE — 92014 COMPRE OPH EXAM EST PT 1/>: CPT | Performed by: OPHTHALMOLOGY

## 2018-08-20 PROCEDURE — 92134 CPTRZ OPH DX IMG PST SGM RTA: CPT | Performed by: OPHTHALMOLOGY

## 2018-08-20 ASSESSMENT — REFRACTION_MANIFEST
OS_VA2: 20/
OS_VA2: 20/
OD_VA3: 20/
OD_VA1: 20/
OS_VA3: 20/
OS_VA1: 20/
OU_VA: 20/
OD_VA1: 20/
OS_VA1: 20/
OD_VA2: 20/
OD_VA3: 20/
OD_VA2: 20/
OS_VA3: 20/
OD_VA1: 20/
OD_VA3: 20/
OS_VA3: 20/
OU_VA: 20/
OS_VA1: 20/
OD_VA2: 20/
OS_VA2: 20/
OU_VA: 20/

## 2018-08-20 ASSESSMENT — REFRACTION_CURRENTRX
OS_OVR_VA: 20/
OD_OVR_VA: 20/
OS_OVR_VA: 20/
OD_OVR_VA: 20/
OS_OVR_VA: 20/
OD_OVR_VA: 20/

## 2018-08-20 ASSESSMENT — SPHEQUIV_DERIVED
OD_SPHEQUIV: -0.375
OS_SPHEQUIV: -0.375

## 2018-08-20 ASSESSMENT — REFRACTION_AUTOREFRACTION
OD_AXIS: 084
OS_SPHERE: +0.25
OD_CYLINDER: -1.25
OS_AXIS: 085
OD_SPHERE: +0.25
OS_CYLINDER: -1.25

## 2018-08-20 ASSESSMENT — CONFRONTATIONAL VISUAL FIELD TEST (CVF)
OD_FINDINGS: FULL
OS_FINDINGS: FULL

## 2018-08-20 ASSESSMENT — VISUAL ACUITY
OD_BCVA: 20/80
OS_BCVA: 20/100

## 2018-09-10 ENCOUNTER — TRANSCRIBE ORDERS (OUTPATIENT)
Dept: PHYSICAL THERAPY | Age: 38
End: 2018-09-10

## 2018-09-10 ENCOUNTER — EVALUATION (OUTPATIENT)
Dept: PHYSICAL THERAPY | Age: 38
End: 2018-09-10
Payer: COMMERCIAL

## 2018-09-10 DIAGNOSIS — M25.561 CHRONIC PAIN OF RIGHT KNEE: Primary | ICD-10-CM

## 2018-09-10 DIAGNOSIS — G89.29 CHRONIC PAIN OF RIGHT KNEE: Primary | ICD-10-CM

## 2018-09-10 DIAGNOSIS — M25.562 CHRONIC PAIN OF LEFT KNEE: ICD-10-CM

## 2018-09-10 DIAGNOSIS — G89.29 CHRONIC PAIN OF LEFT KNEE: ICD-10-CM

## 2018-09-10 PROCEDURE — G8978 MOBILITY CURRENT STATUS: HCPCS | Performed by: PHYSICAL THERAPIST

## 2018-09-10 PROCEDURE — G8979 MOBILITY GOAL STATUS: HCPCS | Performed by: PHYSICAL THERAPIST

## 2018-09-10 PROCEDURE — 97162 PT EVAL MOD COMPLEX 30 MIN: CPT | Performed by: PHYSICAL THERAPIST

## 2018-09-10 NOTE — PROGRESS NOTES
PT Evaluation     Today's date: 9/10/2018  Patient name: Mara Hammer  : [birth year]  MRN: [id number]  Referring provider: Gay Moeller DO  Dx:   Encounter Diagnosis     ICD-10-CM    1  Chronic pain of right knee M25 561     G89 29    2  Chronic pain of left knee M25 562     G89 29                   Assessment  Impairments: abnormal gait, abnormal or restricted ROM, impaired physical strength and pain with function    Assessment details: Patient reported onset of bilateral knee pain for > 3-4 years  Patient noted onset of bilateral knee with left starting first f/b right knee pain production each of insidious onset  Patient reported she had x-ray on bilateral knees and she noted she had OA changes  Patient noted she has bilateral shoulder and back arthritic changes as well  Patient noted she had trial of land based PT but she did not have success with land based PT  Patient noted she has had bilateral knee injections which were not effective for long term pain reduction  Patient noted her bilateral knees due become edematous  Patient noted reported constant bilateral le paresthesias  Patient noted the following functional deficits due to bilateral knee pain: walking, standing, stair climbing, sleeping  Patient noted pain in bilateral knees and bilateral le paresthesias causes her to sit  Patient noted transfers are difficult, as well as she requires assistance from her  with le dressing  Patient noted use of heat is beneficial for pain reduction  Understanding of Dx/Px/POC: good   Prognosis: fair  Prognosis details: Patient is a 34y o  year old female seen for outpatient PT evaluation with a diagnosis of bilateral knee pain and bilateral knee flexion and extension dysfunction that limits all iadls and weight baring activities   Patient presents to PT IE with the following problems, concerns, deficits and impairments: bilateral knee pain, decreased bilateral le range of motion, decreased bilateral le strength, + TTP, gait and stair dysfunctions, bilateral knee edema, functional limitations and decreased tolerance to activity  Patient would benefit from skilled PT services under the following PT treatment plan 1 to 2 x per week for 10 to 12 week to address the above noted deficits: Pool and land based PT to facilitate bilateral le rom and strength, gait and stair training, transfer training, balance and proprioception activities, modalities, manual therapy techniques and a hep  Thank you for the referral      Goals  Short Term goals - 4 to 6 weeks  1  Patient will be independent HEP  2   Patient will report a 25 - 50% decrease in pain complaints  3   Increase strength 1/2 grade  4   Increase ROM 5-10 degrees  Long Term goals - 10 to 12 weeks  1  Patient will report elimination of pain complaints  2   Patient will return to all work related activities without restriction  3   Patient will return to all recreational activities without restriction  4   ROM WFL  5   Strength 5/5   6   Patient will report transfers improved by > 50 %  7   Patient will report gait and stair climbing improved by > 50 %  8   Patient will report iadls improved by > 50 %  9   Patient will report self foto computerized functional index improved > 39      Plan  Patient would benefit from: PT eval  Planned modality interventions: cryotherapy, TENS, thermotherapy: hydrocollator packs and unattended electrical stimulation  Planned therapy interventions: IADL retraining, joint mobilization, manual therapy, activity modification, ADL retraining, ADL training, aquatic therapy, balance, neuromuscular re-education, compression, patient education, self care, strengthening, stretching, therapeutic activities, therapeutic exercise, therapeutic training, flexibility, functional ROM exercises, gait training, graded activity, graded exercise, graded motor, home exercise program and transfer training  Frequency: 2x week  Duration in visits: 12        Subjective Evaluation    History of Present Illness  Mechanism of injury: Patient PMHx is remarkable for HTN, DM Type 2, sleep apnea,anxiety, PTSD, Asthma, FM,  depression, gall bladder removed  Pain  At best pain ratin  At worst pain rating: 10  Location: bilateral  knee pain      Diagnostic Tests  X-ray: abnormal  Patient Goals  Patient goals for therapy: decreased edema and decreased pain  Patient goal: Patient's goal:" to have pain to decrease and resume her normal activities"  Objective     Tenderness     Additional Tenderness Details  Patient is + moderate to severe TTP at medial joint line on bilateral knees and minimal to moderate TTP at minimal to moderate levels  Active Range of Motion   Left Hip   Flexion: 64 degrees with pain    Right Hip   Flexion: 68 degrees with pain  Left Knee   Flexion: 100 degrees WFL  Extension: -12 degrees WFL    Right Knee   Flexion: 105 degrees with pain  Extension: -16 degrees with pain  Left Ankle/Foot   Dorsiflexion (ke): 4 degrees with pain  Plantar flexion: 22 degrees with pain    Right Ankle/Foot   Dorsiflexion (ke): 8 degrees with pain  Plantar flexion: 30 degrees with pain    Strength/Myotome Testing     Left Hip   Planes of Motion   Flexion: 3+  Extension: 3+  Abduction: 3+  Adduction: 4-    Right Hip   Planes of Motion   Flexion: 3+  Extension: 4-  Abduction: 4-  Adduction: 4-    Left Knee   Flexion: 4-  Extension: 4    Right Knee   Flexion: 4  Extension: 4    Left Ankle/Foot   Dorsiflexion: 4-  Plantar flexion: 4    Right Ankle/Foot   Dorsiflexion: 4  Plantar flexion: 4+    Ambulation     Ambulation: Level Surfaces   Ambulation with assistive device: independent    Additional Level Surfaces Ambulation Details  Patient ambulates with spc ( hurricane ) with decrease in pace, decrease in bilateral step length, decrease bilateral knee flexion with swing phase and bilateral hip in ER with increase in based of support      Ambulation: Stairs Ascend stairs: independent  Pattern: non-reciprocal  Railings: two rails  Descend stairs: independent  Pattern: non-reciprocal  Railings: two rails    General Comments     Knee Comments  Girth Measurements:  Knee:  Suprapatellar region: Right at 55 2 CM and left at 55 0 CM; Mid patellar region: Right at 50 1 CM and left at 51 1 Cm; Infrapatellar region: Right at 48 2 CM and left at 48 2 Cm; Flowsheet Rows      Most Recent Value   PT/OT G-Codes   Current Score  28   Projected Score  35   FOTO information reviewed  Yes   Assessment Type  Evaluation   G code set  Other PT/OT Primary   Mobility: Walking and Moving Around Current Status ()  CL   Mobility: Walking and Moving Around Goal Status ()  CL          Precautions: Patient PMHx is remarkable for HTN, DM Type 2, sleep apnea,anxiety, PTSD, Asthma, FM,  depression, gall bladder removed      Daily Treatment Diary     Manual                                                                                   Exercise Diary  9/10            Water walking pre and post             Seated DF stretch:B:             Seated Hamstring stretch:B:             LAQ:B:             Hip flexion:B:             Standing HR and TR:B:             SLR x 3:B:             Hamstring curls:B:             Mini squats             Step ups:B:             Step downs:B:             Lunges:B:             sls and Tandem stance:B:             Tandem ambulation             Side stepping without and with squats                                                                                  Modalities

## 2018-09-17 ENCOUNTER — OFFICE VISIT (OUTPATIENT)
Dept: PHYSICAL THERAPY | Age: 38
End: 2018-09-17
Payer: COMMERCIAL

## 2018-09-17 DIAGNOSIS — G89.29 CHRONIC PAIN OF LEFT KNEE: ICD-10-CM

## 2018-09-17 DIAGNOSIS — M25.562 CHRONIC PAIN OF LEFT KNEE: ICD-10-CM

## 2018-09-17 DIAGNOSIS — M25.561 CHRONIC PAIN OF RIGHT KNEE: Primary | ICD-10-CM

## 2018-09-17 DIAGNOSIS — G89.29 CHRONIC PAIN OF RIGHT KNEE: Primary | ICD-10-CM

## 2018-09-17 PROCEDURE — 97113 AQUATIC THERAPY/EXERCISES: CPT | Performed by: SPECIALIST/TECHNOLOGIST

## 2018-09-17 NOTE — PROGRESS NOTES
Daily Note     Today's date: 2018  Patient name: Pino Morrison  : [birth year]  MRN: [id number]  Referring provider: Kendal Cartagena DO  Dx:   Encounter Diagnosis     ICD-10-CM    1  Chronic pain of right knee M25 561     G89 29    2  Chronic pain of left knee M25 562     G89 29                   Subjective: Pt reports pain relief in her B knees during aquatic therapy  Objective: See treatment diary below    Precautions: Patient PMHx is remarkable for HTN, DM Type 2, sleep apnea,anxiety, PTSD, Asthma, FM,  depression, gall bladder removed  Daily Treatment Diary     Manual                                                                                   Exercise Diary              Water walking pre and post 5x            Seated DF stretch:B: 4x :30            Seated Hamstring stretch:B: 4x :30            LAQ:B: 20x            Hip flexion:B: 20x            Standing HR and TR:B: 20x            SLR x 3:B: 10x            Hamstring curls:B: 20x            Mini squats 15x            Step ups:B: NT            Step downs:B:             Lunges:B:             sls and Tandem stance:B:             Tandem ambulation             Side stepping without and with squats NT                                                                                 Modalities                                                           Assessment: Initiated aquatic exercise program this visit  Pt demonstrates adequate exercise application with visual and verbal demonstration  Tolerated treatment well  Pt educated about the possibility of DOMS after initiating an exercise program  Pt required increased time to perform exercises this visit  Patient would benefit from continued PT to improve B knee pain  Plan: Continue per plan of care  Progress treatment as tolerated  Monitor pt response to exercise and modify treatment to pt tolerance

## 2018-09-19 ENCOUNTER — OFFICE VISIT (OUTPATIENT)
Dept: PHYSICAL THERAPY | Age: 38
End: 2018-09-19
Payer: COMMERCIAL

## 2018-09-19 DIAGNOSIS — G89.29 CHRONIC PAIN OF RIGHT KNEE: Primary | ICD-10-CM

## 2018-09-19 DIAGNOSIS — M25.562 CHRONIC PAIN OF LEFT KNEE: ICD-10-CM

## 2018-09-19 DIAGNOSIS — M25.561 CHRONIC PAIN OF RIGHT KNEE: Primary | ICD-10-CM

## 2018-09-19 DIAGNOSIS — G89.29 CHRONIC PAIN OF LEFT KNEE: ICD-10-CM

## 2018-09-19 PROCEDURE — 97113 AQUATIC THERAPY/EXERCISES: CPT

## 2018-09-19 NOTE — PROGRESS NOTES
Daily Note     Today's date: 2018  Patient name: Naz Kelley  :   MRN: [id number]  Referring provider: Nasra Emerson DO  Dx:   Encounter Diagnosis     ICD-10-CM    1  Chronic pain of right knee M25 561     G89 29    2  Chronic pain of left knee M25 562     G89 29                   /Subjective: Pt noted L shoulder, LB B knees  7/10 pain today  Objective: See treatment diary below    Precautions: Patient PMHx is remarkable for HTN, DM Type 2, sleep apnea,anxiety, PTSD, Asthma, FM,  depression, gall bladder removed  Daily Treatment Diary     Manual                                                                                   Exercise Diary             Water walking pre and post 5x 5/5            Seated DF stretch:B: 4x :30            Seated Hamstring stretch:B: 4x :30 20sec 5x            LAQ:B: 20x 20x            Hip flexion:B: 20x 20x            Standing HR and TR:B: 20x 20x            SLR x 3:B: 10x 20x            Hamstring curls:B: 20x 20x            Mini squats 15x 20x            Step ups:B: NT 10x           Step downs:B:  NT            Lunges:B:  NT            sls and Tandem stance:B:  NT            Tandem ambulation  NT            Side stepping without and with squats NT 6x across the pool                                                                                 Modalities                                                           Assessment: Decreased pain to a 5/10 after aqua therapy session today  Plan: Continue per plan of care  Progress treatment as tolerated  Monitor pt response to exercise and modify treatment to pt tolerance

## 2018-09-26 ENCOUNTER — APPOINTMENT (OUTPATIENT)
Dept: PHYSICAL THERAPY | Age: 38
End: 2018-09-26
Payer: COMMERCIAL

## 2018-09-28 ENCOUNTER — APPOINTMENT (OUTPATIENT)
Dept: PHYSICAL THERAPY | Age: 38
End: 2018-09-28
Payer: COMMERCIAL

## 2018-10-18 NOTE — PROGRESS NOTES
Patient d/c to hep due to not returning to PT  Thus, please refer to PT IE dated 9-10-18 for patient status at most recent comprehensive assessment prior to d/c to hep

## 2018-10-30 ENCOUNTER — DOCTOR'S OFFICE (OUTPATIENT)
Dept: URBAN - METROPOLITAN AREA CLINIC 136 | Facility: CLINIC | Age: 38
Setting detail: OPHTHALMOLOGY
End: 2018-10-30
Payer: COMMERCIAL

## 2018-10-30 DIAGNOSIS — E11.3413: ICD-10-CM

## 2018-10-30 DIAGNOSIS — E10.3393: ICD-10-CM

## 2018-10-30 DIAGNOSIS — H25.13: ICD-10-CM

## 2018-10-30 DIAGNOSIS — F17.200: ICD-10-CM

## 2018-10-30 DIAGNOSIS — H40.013: ICD-10-CM

## 2018-10-30 PROCEDURE — 92014 COMPRE OPH EXAM EST PT 1/>: CPT | Performed by: OPHTHALMOLOGY

## 2018-10-30 PROCEDURE — 92134 CPTRZ OPH DX IMG PST SGM RTA: CPT | Performed by: OPHTHALMOLOGY

## 2018-10-30 ASSESSMENT — SPHEQUIV_DERIVED
OD_SPHEQUIV: -0.375
OS_SPHEQUIV: -0.375

## 2018-10-30 ASSESSMENT — REFRACTION_MANIFEST
OS_VA2: 20/
OU_VA: 20/
OS_VA3: 20/
OD_VA2: 20/
OS_VA3: 20/
OD_VA3: 20/
OD_VA1: 20/
OU_VA: 20/
OS_VA1: 20/
OS_VA1: 20/
OD_VA2: 20/
OS_VA2: 20/
OD_VA1: 20/
OD_VA3: 20/

## 2018-10-30 ASSESSMENT — VISUAL ACUITY
OS_BCVA: 20/100
OD_BCVA: 20/80

## 2018-10-30 ASSESSMENT — REFRACTION_CURRENTRX
OS_OVR_VA: 20/
OD_OVR_VA: 20/
OS_OVR_VA: 20/
OD_OVR_VA: 20/
OD_OVR_VA: 20/
OS_OVR_VA: 20/

## 2018-10-30 ASSESSMENT — REFRACTION_AUTOREFRACTION
OD_CYLINDER: -1.25
OS_AXIS: 085
OD_AXIS: 084
OS_CYLINDER: -1.25
OS_SPHERE: +0.25
OD_SPHERE: +0.25

## 2018-10-30 ASSESSMENT — CONFRONTATIONAL VISUAL FIELD TEST (CVF)
OS_FINDINGS: FULL
OD_FINDINGS: FULL

## 2018-11-01 ENCOUNTER — OPTICAL OFFICE (OUTPATIENT)
Dept: URBAN - METROPOLITAN AREA CLINIC 143 | Facility: CLINIC | Age: 38
Setting detail: OPHTHALMOLOGY
End: 2018-11-01
Payer: COMMERCIAL

## 2018-11-01 ENCOUNTER — DOCTOR'S OFFICE (OUTPATIENT)
Dept: URBAN - METROPOLITAN AREA CLINIC 136 | Facility: CLINIC | Age: 38
Setting detail: OPHTHALMOLOGY
End: 2018-11-01
Payer: COMMERCIAL

## 2018-11-01 DIAGNOSIS — H52.4: ICD-10-CM

## 2018-11-01 DIAGNOSIS — H52.223: ICD-10-CM

## 2018-11-01 PROBLEM — E11.3411 DM TYPE 2; RIGHT SEVERE WITH ME, LEFT SEVERE WITH ME: Status: ACTIVE | Noted: 2018-10-30

## 2018-11-01 PROBLEM — E10.3393 DIABETIC MACULAR EDEMA; BOTH EYES: Status: ACTIVE | Noted: 2018-10-30

## 2018-11-01 PROBLEM — H25.13 CATARACT NUCLEAR SCLEROSIS ; BOTH EYES: Status: ACTIVE | Noted: 2017-02-03

## 2018-11-01 PROBLEM — E11.3412 DM TYPE 2; RIGHT SEVERE WITH ME, LEFT SEVERE WITH ME: Status: ACTIVE | Noted: 2018-10-30

## 2018-11-01 PROBLEM — H40.003 GLAUCOMA SUSPECT; BOTH EYES: Status: ACTIVE | Noted: 2017-02-03

## 2018-11-01 PROBLEM — F17.200 TOBACCO USE DISORDER: Status: ACTIVE | Noted: 2017-02-03

## 2018-11-01 PROCEDURE — 92015 DETERMINE REFRACTIVE STATE: CPT | Performed by: OPTOMETRIST

## 2018-11-01 PROCEDURE — V2020 VISION SVCS FRAMES PURCHASES: HCPCS | Performed by: OPTOMETRIST

## 2018-11-01 PROCEDURE — V2781 PROGRESSIVE LENS PER LENS: HCPCS | Performed by: OPTOMETRIST

## 2018-11-01 PROCEDURE — V2203 LENS SPHCYL BIFOCAL 4.00D/.1: HCPCS | Performed by: OPTOMETRIST

## 2018-11-01 ASSESSMENT — REFRACTION_MANIFEST
OD_VA3: 20/
OS_VA1: 20/
OS_VA1: 20/30
OU_VA: 20/30
OD_VA1: 20/
OD_AXIS: 077
OD_ADD: +1.50
OS_ADD: +1.50
OS_CYLINDER: -0.50
OS_VA2: 20/
OS_VA2: 20/
OD_CYLINDER: -0.75
OD_SPHERE: PLANO
OD_VA1: 20/30
OS_AXIS: 070
OS_VA3: 20/
OS_VA3: 20/
OD_VA3: 20/
OU_VA: 20/
OD_VA2: 20/
OS_SPHERE: -0.25
OD_VA2: 20/

## 2018-11-01 ASSESSMENT — REFRACTION_CURRENTRX
OD_OVR_VA: 20/
OD_OVR_VA: 20/
OS_OVR_VA: 20/
OS_OVR_VA: 20/
OD_OVR_VA: 20/
OS_OVR_VA: 20/

## 2018-11-01 ASSESSMENT — REFRACTION_AUTOREFRACTION
OD_AXIS: 084
OD_CYLINDER: -1.00
OS_CYLINDER: -1.00
OS_SPHERE: -0.25
OD_SPHERE: +0.25
OS_AXIS: 075

## 2018-11-01 ASSESSMENT — VISUAL ACUITY
OD_BCVA: 20/70
OS_BCVA: 20/100

## 2018-11-01 ASSESSMENT — SPHEQUIV_DERIVED
OS_SPHEQUIV: -0.75
OS_SPHEQUIV: -0.5
OD_SPHEQUIV: -0.25

## 2019-02-11 ENCOUNTER — TRANSCRIBE ORDERS (OUTPATIENT)
Dept: MULTI SPECIALTY CLINIC | Facility: CLINIC | Age: 39
End: 2019-02-11

## 2019-02-11 ENCOUNTER — TRANSCRIBE ORDERS (OUTPATIENT)
Dept: INTERNAL MEDICINE CLINIC | Facility: CLINIC | Age: 39
End: 2019-02-11

## 2019-02-19 ENCOUNTER — CONSULT (OUTPATIENT)
Dept: MULTI SPECIALTY CLINIC | Facility: CLINIC | Age: 39
End: 2019-02-19

## 2019-02-19 VITALS
BODY MASS INDEX: 47.09 KG/M2 | SYSTOLIC BLOOD PRESSURE: 140 MMHG | TEMPERATURE: 98.1 F | DIASTOLIC BLOOD PRESSURE: 86 MMHG | WEIGHT: 293 LBS | HEIGHT: 66 IN | HEART RATE: 88 BPM

## 2019-02-19 DIAGNOSIS — L97.509 DIABETIC FOOT ULCER ASSOCIATED WITH DIABETES MELLITUS DUE TO UNDERLYING CONDITION, UNSPECIFIED LATERALITY, UNSPECIFIED PART OF FOOT, UNSPECIFIED ULCER STAGE (HCC): Primary | ICD-10-CM

## 2019-02-19 DIAGNOSIS — E08.621 DIABETIC FOOT ULCER ASSOCIATED WITH DIABETES MELLITUS DUE TO UNDERLYING CONDITION, UNSPECIFIED LATERALITY, UNSPECIFIED PART OF FOOT, UNSPECIFIED ULCER STAGE (HCC): Primary | ICD-10-CM

## 2019-02-19 PROCEDURE — 99213 OFFICE O/P EST LOW 20 MIN: CPT | Performed by: PODIATRIST

## 2019-02-19 NOTE — PROGRESS NOTES
Podiatry Clinic Visit  Marlyn Hudson 37 y o  female MRN: [id number]  Encounter: [id number]    Assessment/Plan     Assessment:  1  Diabetic foot ulcer - bilateral  - necrotic eschar noted centrally of all lesions; left plantar forefoot; right medial hallux, right plantar 2nd toe  - all wounds are dry and stable with no ascending cellulitis, no malodor    Plan:  - Discussed with patient she will benefit significantly from going to wound care center, ambulatory referral provided for patient to follow Dr Clarence Tang at [facility]  - Discussed with patient importance of wearing shoes, controlling blood sugar, proper local wound care, also discussed signs and symptoms of infection, patient was in understanding of our discussion today and all questions/concerns were addressed to satisfaction      History of Present Illness     HPI:  Marlyn Hudson is a 37 y o  female who presents with bilateral foot wounds  States she was walking barefeet a couple weeks ago and started developing blisters on the bottom of her feet  States the skin started peeling off and eventually the wounds started becoming more black  Denies any drainage, malodor  States she went to [facility] for evaluation recently and was sent home with some dressing supplies  The patient denies any nausea, vomiting, fever, chills, shortness of breath, or chest pains  Review of Systems   Constitutional: Negative  HENT: Negative  Eyes: Negative  Respiratory: Negative  Cardiovascular: Negative  Gastrointestinal: Negative  Musculoskeletal: Negative   Skin: wounds on bottom of both feet  Neurological: Negative          Historical Information   Past Medical History:   Diagnosis Date    Arthritis     Diabetes mellitus ([address] )     Hypertension     Sleep apnea      Past Surgical History:   Procedure Laterality Date    AXILLARY HIDRADENITIS EXCISION      CHOLECYSTECTOMY       Social History   Social History     Substance and Sexual Activity Alcohol Use No     Social History     Substance and Sexual Activity   Drug Use No     Social History     Tobacco Use   Smoking Status Current Every Day Smoker    Types: Cigarettes   Smokeless Tobacco Never Used     Family History: History reviewed  No pertinent family history  Meds/Allergies     (Not in a hospital admission)  No Known Allergies    Objective     Current Vitals:   Blood Pressure: 140/86 (02/19/19 1057)  Pulse: 88 (02/19/19 1057)  Temperature: 98 1 °F (36 7 °C) (02/19/19 1057)  Temp Source: Oral (02/19/19 1057)  Height: 5' 6" (167 6 cm) (02/19/19 1057)  Weight - Scale: 135 kg (297 lb 9 9 oz) (02/19/19 1057)        /86 (BP Location: Right arm, Patient Position: Sitting, Cuff Size: Large)   Pulse 88   Temp 98 1 °F (36 7 °C) (Oral)   Ht 5' 6" (1 676 m)   Wt 135 kg (297 lb 9 9 oz)   BMI 48 04 kg/m²       Lower Extremity Exam:    Musculoskeletal:  MMT is 5/5 to all compartments of the LE, generalized edema noted to bilateral lower extremities, ankle ROM WNL b/l     Pulses:   R DP is +2/4, R PT is +2/4, L DP is +2/4, L PT is +2/4, CFT< 3sec to all digits  Pedal hair is Absent     Skin:  Left plantar forefoot wound with dry and stable eschar, right plantar wounds on hallux and 2nd toe also with dry and stable eschar, no signs of acute infection at this time       Neurologic:  Gross sensation is intact   Protective sensation is Absent